# Patient Record
Sex: FEMALE | Employment: FULL TIME | URBAN - METROPOLITAN AREA
[De-identification: names, ages, dates, MRNs, and addresses within clinical notes are randomized per-mention and may not be internally consistent; named-entity substitution may affect disease eponyms.]

---

## 2024-05-07 ENCOUNTER — TRANSFERRED RECORDS (OUTPATIENT)
Dept: HEALTH INFORMATION MANAGEMENT | Facility: CLINIC | Age: 49
End: 2024-05-07

## 2024-05-10 ENCOUNTER — TRANSCRIBE ORDERS (OUTPATIENT)
Dept: OTHER | Age: 49
End: 2024-05-10

## 2024-05-10 DIAGNOSIS — G43.909 MIGRAINE: Primary | ICD-10-CM

## 2024-05-19 ENCOUNTER — HEALTH MAINTENANCE LETTER (OUTPATIENT)
Age: 49
End: 2024-05-19

## 2024-06-05 NOTE — TELEPHONE ENCOUNTER
Records Requested     June 5, 2024 10:28 AM   [id number]   Facility  [facility]   Outcome 10:35 am Sent request for recs & any imaging to be faxed/pushed. -AMADOU Maier on 6/10/2024 at 3:53 PM Records received & sent to scanning. -AMADOU Maier on 6/5/2024 at 10:31 AM   Action Taken Attempted to call Pt to ask if seen anywhere else for headaches/migraines. No answer, left VM. -AMADOU     RECORDS RECEIVED FROM: External    REASON FOR VISIT: Chronic migraines   PROVIDER: Donna Seanz MD   DATE OF APPT: 7/31/24 @ 1:00 pm    NOTES (FOR ALL VISITS) STATUS DETAILS   OFFICE NOTE from referring provider Received 5/10/24 (Transcribed Orders) Lam Amato MD @[facility]     OFFICE NOTE from other specialist In process    MEDICATION LIST N/A    IMAGING  (FOR ALL VISITS)     MRI (HEAD, NECK, SPINE) N/A    CT (HEAD, NECK, SPINE) N/A

## 2024-07-26 ASSESSMENT — MIGRAINE DISABILITY ASSESSMENT (MIDAS)
HOW MANY DAYS DID YOU MISS WORK OR SCHOOL BECAUSE OF HEADACHES: 0
HOW MANY DAYS WAS YOUR PRODUCTIVITY CUT IN HALF BECAUSE OF HEADACHES: 12
HOW MANY DAYS IN THE PAST 3 MONTHS HAVE YOU HAD A HEADACHE: 17
HOW OFTEN WERE SOCIAL ACTIVITIES MISSED DUE TO HEADACHES: 6
ON A SCALE FROM 0-10 ON AVERAGE HOW PAINFUL WERE HEADACHES: 7
HOW MANY DAYS DID YOU NOT DO HOUSEWORK BECAUSE OF HEADACHES: 12
HOW MANY DAYS WAS HOUSEWORK PRODUCTIVITY CUT IN HALF DUE TO HEADACHES: 12
TOTAL SCORE: 42

## 2024-07-26 ASSESSMENT — HEADACHE IMPACT TEST (HIT 6)
HOW OFTEN DID HEADACHS LIMIT CONCENTRATION ON WORK OR DAILY ACTIVITY: ALWAYS
HOW OFTEN DO HEADACHES LIMIT YOUR DAILY ACTIVITIES: VERY OFTEN
WHEN YOU HAVE HEADACHES HOW OFTEN IS THE PAIN SEVERE: SOMETIMES
HIT6 TOTAL SCORE: 69
WHEN YOU HAVE A HEADACHE HOW OFTEN DO YOU WISH YOU COULD LIE DOWN: VERY OFTEN
HOW OFTEN HAVE YOU FELT TOO TIRED TO WORK BECAUSE OF YOUR HEADACHES: VERY OFTEN
HOW OFTEN HAVE YOU FELT FED UP OR IRRITATED BECAUSE OF YOUR HEADACHES: ALWAYS

## 2024-07-31 ENCOUNTER — VIRTUAL VISIT (OUTPATIENT)
Dept: NEUROLOGY | Facility: CLINIC | Age: 49
End: 2024-07-31
Attending: ORTHOPAEDIC SURGERY
Payer: COMMERCIAL

## 2024-07-31 ENCOUNTER — PRE VISIT (OUTPATIENT)
Dept: NEUROLOGY | Facility: CLINIC | Age: 49
End: 2024-07-31

## 2024-07-31 DIAGNOSIS — G43.001 MIGRAINE WITHOUT AURA AND WITH STATUS MIGRAINOSUS, NOT INTRACTABLE: Primary | ICD-10-CM

## 2024-07-31 PROCEDURE — 99204 OFFICE O/P NEW MOD 45 MIN: CPT | Mod: 95 | Performed by: PSYCHIATRY & NEUROLOGY

## 2024-07-31 PROCEDURE — G2211 COMPLEX E/M VISIT ADD ON: HCPCS | Mod: 95 | Performed by: PSYCHIATRY & NEUROLOGY

## 2024-07-31 RX ORDER — LORATADINE 10 MG/1
10 TABLET ORAL DAILY
COMMUNITY

## 2024-07-31 RX ORDER — PANTOPRAZOLE SODIUM 40 MG/1
40 TABLET, DELAYED RELEASE ORAL DAILY
COMMUNITY
Start: 2014-07-26 | End: 2025-02-08

## 2024-07-31 RX ORDER — BUTALBITAL, ACETAMINOPHEN AND CAFFEINE 50; 325; 40 MG/1; MG/1; MG/1
1 TABLET ORAL EVERY 4 HOURS PRN
COMMUNITY
Start: 2024-03-31

## 2024-07-31 RX ORDER — CHLORHEXIDINE GLUCONATE ORAL RINSE 1.2 MG/ML
15 SOLUTION DENTAL 2 TIMES DAILY
COMMUNITY

## 2024-07-31 RX ORDER — FERROUS SULFATE 325(65) MG
325 TABLET ORAL
COMMUNITY
Start: 2024-03-30

## 2024-07-31 RX ORDER — BISACODYL 5 MG/1
5 TABLET, DELAYED RELEASE ORAL DAILY
COMMUNITY
Start: 2024-07-18

## 2024-07-31 RX ORDER — SUMATRIPTAN 100 MG/1
100 TABLET, FILM COATED ORAL
Qty: 18 TABLET | Refills: 11 | Status: SHIPPED | OUTPATIENT
Start: 2024-07-31

## 2024-07-31 RX ORDER — FLUTICASONE PROPIONATE 50 MCG
2 SPRAY, SUSPENSION (ML) NASAL
COMMUNITY

## 2024-07-31 RX ORDER — ONDANSETRON 4 MG/1
1 TABLET, FILM COATED ORAL EVERY 6 HOURS PRN
COMMUNITY
Start: 2024-07-18

## 2024-07-31 NOTE — LETTER
7/31/2024       RE: Melissa Lucero  [address]  [facility] [address]     Dear Colleague,    Thank you for referring your patient, Melissa Lucero, to the [facility] [place] at [facility]. Please see a copy of my visit note below.    Virtual Visit Details    Type of service:  Video Visit     Originating Location (pt. Location): Home    Distant Location (provider location):  Off-site  Platform used for Video Visit: [facility]   Virtual Headache Neurology Consult  July 31, 2024     Melissa Lucero MRN# [id number]   YOB: 1975 Age: 48 year old     Requesting physician: Lam Amato MD  PCP: Gifty Heredia MD         Assessment and Recommendations:     Melissa Lucero is a 48 year old female who presents for further evaluation of headache.    Her headaches meet criteria for episodic migraine without aura. I suspect she has this on a genetic basis. She is currently breastfeeding.    Her virtual neurologic exam is intact today. Reportedly, previous head imaging was unrevealing in 2000's.     I recommend the following treatment strategy:  -For acute treatment of moderate to severe headache, I recommend sumatriptan 100 mg at the onset of headache, with a repeat dose in two hours if needed.    She would not currently choose a preventive medication, although is interested in this for the future, once she has completed breastfeeding.  -Discuss CGRP inhibitors in 6 months.    The longitudinal plan of care for Melissa was addressed during this visit. Due to the added complexity in care, I will continue to support Melissa in the subsequent management of this condition(s) and with the ongoing continuity of care of this condition(s).    Donna Saenz MD  Neurology            Chief Complaint:     No chief complaint on file.          History is obtained from the patient and medical record.   Patient was seen via a virtual visit in their home.      Melissa Lucero is a 48 year old female who has been living with headache since age 16, starting seeking help around .     She describes headache as posterior and left sided, can wrap around head as well. It is a pressure, stabbing/spiking pain. They rate 6-8/10, up to 9-10/10. They last 10-18 hours, up to 2 days.     She has associated photophobia, phonophobia, rare nausea.    She denies typical aura. She denies unilateral autonomic features. No fevers or other illness associated.    She has 10-14/30 headache days per month, with 3-6/30 severe headache days per month    Triggers: lights, cashews.    She is using Aleve.  She used Imitrex tablet and injection as needed. She found 100 mg effective after 45 minutes.  She used butalbital in pregnancy.    She has tried:  Propranolol - side effects or not effective  Topiramate - side effects or not effective  Anti-depressant medication - side effects or not effective  Botox - drooping of eyebrow and no effect, two round for headache, others for cosmetic reasons, not effective            Past Medical History:   Neck spasm on right side, trapezius muscle           Past Surgical History:   Low back surgery - laminectomy lumbar spine   Uvulectomy and tonsillectomy    in  and           Social History:   She is working at a desk all day long.    She is  with children.    She denies smoking. She denies alcohol. She denies drug use.     She drinks caffeine, 1-2 cups of black tea or coffee daily.          Family History:   Father           Allergies:    No Known Allergies          Medications:     Current Outpatient Medications:     bisacodyl (DULCOLAX) 5 MG EC tablet, Take 5 mg by mouth daily, Disp: , Rfl:     butalbital-acetaminophen-caffeine (ESGIC) -40 MG tablet, Take 1 tablet by mouth every 4 hours as needed, Disp: , Rfl:     chlorhexidine (PERIDEX) 0.12 % solution, Take 15 mLs by  mouth 2 times daily, Disp: , Rfl:     ferrous sulfate (FEROSUL) 325 (65 Fe) MG tablet, Take 325 mg by mouth every 48 hours, Disp: , Rfl:     fluticasone (FLONASE) 50 MCG/ACT nasal spray, Spray 2 sprays in nostril, Disp: , Rfl:     loratadine (CLARITIN) 10 MG tablet, Take 10 mg by mouth daily, Disp: , Rfl:     ondansetron (ZOFRAN) 4 MG tablet, Take 1 tablet by mouth every 6 hours as needed, Disp: , Rfl:     pantoprazole (PROTONIX) 40 MG EC tablet, Take 40 mg by mouth daily, Disp: , Rfl:           Physical Exam:   There were no vitals taken for this visit.   Physical Exam:   General: NAD  Neurologic:   Mental Status Exam: Alert, awake and oriented to situation. No dysarthria. Speech of normal fluency.   Cranial Nerves: Pupils equal, EOMs intact, no nystagmus, facial movements symmetric, hearing intact to conversation, tongue midline and fully mobile. No tongue atrophy or fasciculations.    Motor: No drift in upper extremities. Able to stand from a seated position without use of arms. No tremors or abnormal movements noted.   Coordination: With arms outstretched, able to touch nose using index finger accurately bilaterally.  Normal finger tapping bilaterally.     Gait: Normal stance and casual gait.    Neck: Reduced range of motion with lateral head movements and neck flexion.  Eyes: No conjunctival injection, no scleral icterus.           Data:   Eye exam UTD    Previous MRI brain in 2007/2008. No concerns per patient.         Again, thank you for allowing me to participate in the care of your patient.      Sincerely,    Donna Saenz MD

## 2024-07-31 NOTE — NURSING NOTE
Current patient location: [facility] [place] [facility] [address]    Is the patient currently in the state of [place]? YES    Visit mode:VIDEO    If the visit is dropped, the patient can be reconnected by: TELEPHONE VISIT: Phone number:   Telephone Information:   Mobile [phone]       Will anyone else be joining the visit? NO  (If patient encounters technical issues they should call [phone]    How would you like to obtain your AVS? MyChart    Are changes needed to the allergy or medication list? Pt stated no med changes    Are refills needed on medications prescribed by this physician? NO    Rooming Documentation:  Assigned questionnaire(s) completed      Reason for visit: Video Visit (Chronic migraines )    Glenis NGUYEN

## 2024-11-25 DIAGNOSIS — G43.001 MIGRAINE WITHOUT AURA AND WITH STATUS MIGRAINOSUS, NOT INTRACTABLE: Primary | ICD-10-CM

## 2024-11-25 RX ORDER — FREMANEZUMAB-VFRM 225 MG/1.5ML
1.5 INJECTION SUBCUTANEOUS
Qty: 1.5 ML | Refills: 11 | Status: SHIPPED | OUTPATIENT
Start: 2024-11-25

## 2024-11-25 NOTE — PROGRESS NOTES
Patient completed breastfeeding. Would like to start a CGRP inhibitor for migraine prevention, as previously discussed. Ordered Danitza Kitchen MD

## 2025-03-19 ASSESSMENT — HEADACHE IMPACT TEST (HIT 6)
HOW OFTEN HAVE YOU FELT FED UP OR IRRITATED BECAUSE OF YOUR HEADACHES: VERY OFTEN
WHEN YOU HAVE A HEADACHE HOW OFTEN DO YOU WISH YOU COULD LIE DOWN: VERY OFTEN
HOW OFTEN HAVE YOU FELT TOO TIRED TO WORK BECAUSE OF YOUR HEADACHES: SOMETIMES
HOW OFTEN DID HEADACHS LIMIT CONCENTRATION ON WORK OR DAILY ACTIVITY: ALWAYS
HIT6 TOTAL SCORE: 67
HOW OFTEN DO HEADACHES LIMIT YOUR DAILY ACTIVITIES: VERY OFTEN
WHEN YOU HAVE HEADACHES HOW OFTEN IS THE PAIN SEVERE: VERY OFTEN

## 2025-03-19 ASSESSMENT — MIGRAINE DISABILITY ASSESSMENT (MIDAS)
HOW MANY DAYS DID YOU NOT DO HOUSEWORK BECAUSE OF HEADACHES: 10
HOW OFTEN WERE SOCIAL ACTIVITIES MISSED DUE TO HEADACHES: 0
HOW MANY DAYS DID YOU MISS WORK OR SCHOOL BECAUSE OF HEADACHES: 0
HOW MANY DAYS IN THE PAST 3 MONTHS HAVE YOU HAD A HEADACHE: 23
ON A SCALE FROM 0-10 ON AVERAGE HOW PAINFUL WERE HEADACHES: 7
HOW MANY DAYS WAS YOUR PRODUCTIVITY CUT IN HALF BECAUSE OF HEADACHES: 8
TOTAL SCORE: 28
HOW MANY DAYS WAS HOUSEWORK PRODUCTIVITY CUT IN HALF DUE TO HEADACHES: 10

## 2025-03-24 ENCOUNTER — VIRTUAL VISIT (OUTPATIENT)
Dept: NEUROLOGY | Facility: CLINIC | Age: 50
End: 2025-03-24
Payer: COMMERCIAL

## 2025-03-24 DIAGNOSIS — G43.001 MIGRAINE WITHOUT AURA AND WITH STATUS MIGRAINOSUS, NOT INTRACTABLE: ICD-10-CM

## 2025-03-24 PROCEDURE — 1126F AMNT PAIN NOTED NONE PRSNT: CPT | Mod: 95 | Performed by: PSYCHIATRY & NEUROLOGY

## 2025-03-24 PROCEDURE — G2211 COMPLEX E/M VISIT ADD ON: HCPCS | Mod: 95 | Performed by: PSYCHIATRY & NEUROLOGY

## 2025-03-24 PROCEDURE — 98006 SYNCH AUDIO-VIDEO EST MOD 30: CPT | Performed by: PSYCHIATRY & NEUROLOGY

## 2025-03-24 RX ORDER — SUMATRIPTAN SUCCINATE 100 MG/1
100 TABLET ORAL
Qty: 18 TABLET | Refills: 11 | Status: SHIPPED | OUTPATIENT
Start: 2025-03-24

## 2025-03-24 ASSESSMENT — PAIN SCALES - GENERAL: PAINLEVEL_OUTOF10: NO PAIN (0)

## 2025-03-24 NOTE — NURSING NOTE
Current patient location: Patient declined to provide     Is the patient currently in the state of [place]? YES    Visit mode: VIDEO    If the visit is dropped, the patient can be reconnected by:VIDEO VISIT: Text to cell phone:   Telephone Information:   Mobile [phone]    and VIDEO VISIT: Send to e-mail at: [email or web address]    Will anyone else be joining the visit? NO  (If patient encounters technical issues they should call [phone])    Are changes needed to the allergy or medication list? No    Are refills needed on medications prescribed by this physician? NO    Rooming Documentation:  Questionnaire(s) completed    Reason for visit: CESAR DAVISF

## 2025-03-24 NOTE — PROGRESS NOTES
Virtual Visit Details    Type of service:  Video Visit     Originating Location (pt. Location): Home    Distant Location (provider location):  On-site  Platform used for Video Visit: [facility]    Headache Neurology Progress Note  March 24, 2025      Assessment/Plan:   Melissa Lucero is a 49 year old woman who presents for further evaluation of headache.     Her headaches meet criteria for frequent episodic migraine without aura.    I recommend the following treatment strategy:  -For acute treatment of moderate to severe headache, I recommend sumatriptan 100 mg at the onset of headache, with a repeat dose in two hours if needed.     Her headache frequency and severity warrant prevention.  Ajovy was not adequately effective.  -CGRP inhibitors: Aimovig, Emgality, Qulipta, Nurtec, Vyepti  -She will send a message after considering these options.    The longitudinal plan of care for Melissa was addressed during this visit. Due to the added complexity in care, I will continue to support Melissa in the subsequent management of this condition(s) and with the ongoing continuity of care of this condition(s).  I will plan to see her back in 3 to 6 months.    Donna Saenz MD  Neurology     Subjective:    Melissa Lucero returns for follow up of episodic migraine without aura.    Ajovy not effective after second injection. 9 in a row, within 14 days.     Since February 2025, she reports 2-3 moderate to severe headaches weekly. Can wake up with headaches as well, which dissipate within 1-2 hours. These feel more muscular, like neck discomfort from sleeping, 3-5 times per week.    Has a 1 year old and 2 year old children.    Sound triggers.    Restarted menstrual cycle after pregnancy and breastfeeding. Stopped breastfeeding last November, cycle returned February 14th. Had 1-2 headaches with cycles recently.    Takes sumatriptan as needed. Also Aleve as needed. Useful 90% of the  time.    She has tried:  Propranolol - side effects or not effective  Topiramate - side effects or not effective  Anti-depressant medication - side effects or not effective  Botox - drooping of eyebrow and no effect, two round for headache, others for cosmetic reasons, not effective    Objective:    Vitals: There were no vitals taken for this visit.  General: Cooperative, NAD  Neurologic:  Mental Status: Fully alert, attentive and oriented. Speech clear and fluent.   Cranial Nerves: Facial movements symmetric.   Motor: No abnormal movements.      Pertinent Investigations:          7/26/2024    12:08 PM 3/19/2025     9:38 AM   HIT-6   When you have headaches, how often is the pain severe 10 11   How often do headaches limit your ability to do usual daily activities including household work, work, school, or social activities? 11 11   When you have a headache, how often do you wish you could lie down? 11 11   In the past 4 weeks, how often have you felt too tired to do work or daily activities because of your headaches 11 10   In the past 4 weeks, how often have you felt fed up or irritated because of your headaches 13 11   In the past 4 weeks, how often did headaches limit your ability to concentrate on work or daily activities 13 13   HIT-6 Total Score 69 67        Patient-reported           7/26/2024    12:34 PM 3/19/2025     9:42 AM   MIDAS - in the past three months:   On how many days did you miss work or school because of your headaches? 0 0   How many days was your productivity at work or school reduced by half or more because of your headaches? 12 8   On how many days did you not do household work because of your headaches? 12 10   How many days was your productivity in household work reduced by half or more because of your headaches? 12 10   On how many days did you miss family, social, or leisure activities because of your headaches? 6 0   On how many days did you have a headache? 17 23   On a scale of  0-10, on average how painful were these headaches? 7 7   MIDAS Score 42 (IV - Severe Disability) 28 (IV - Severe Disability)

## 2025-03-24 NOTE — LETTER
3/24/2025       RE: Melissa Lucero  [address]  [facility] [address]     Dear Colleague,    Thank you for referring your patient, Melissa Lucero, to the [facility] [place] at [facility]. Please see a copy of my visit note below.    Virtual Visit Details    Type of service:  Video Visit     Originating Location (pt. Location): Home    Distant Location (provider location):  On-site  Platform used for Video Visit: [facility]    Headache Neurology Progress Note  March 24, 2025      Assessment/Plan:   Melissa Lucero is a 49 year old woman who presents for further evaluation of headache.     Her headaches meet criteria for frequent episodic migraine without aura.    I recommend the following treatment strategy:  -For acute treatment of moderate to severe headache, I recommend sumatriptan 100 mg at the onset of headache, with a repeat dose in two hours if needed.     Her headache frequency and severity warrant prevention.  Ajovy was not adequately effective.  -CGRP inhibitors: Aimovig, Emgality, Qulipta, Nurtec, Vyepti  -She will send a message after considering these options.    The longitudinal plan of care for Melissa was addressed during this visit. Due to the added complexity in care, I will continue to support Melissa in the subsequent management of this condition(s) and with the ongoing continuity of care of this condition(s).  I will plan to see her back in 3 to 6 months.    Donna Saenz MD  Neurology     Subjective:    Melissa Lucero returns for follow up of episodic migraine without aura.    Ajovy not effective after second injection. 9 in a row, within 14 days.     Since February 2025, she reports 2-3 moderate to severe headaches weekly. Can wake up with headaches as well, which dissipate within 1-2 hours. These feel more muscular, like neck discomfort from sleeping, 3-5  times per week.    Has a 1 year old and 2 year old children.    Sound triggers.    Restarted menstrual cycle after pregnancy and breastfeeding. Stopped breastfeeding last November, cycle returned February 14th. Had 1-2 headaches with cycles recently.    Takes sumatriptan as needed. Also Aleve as needed. Useful 90% of the time.    She has tried:  Propranolol - side effects or not effective  Topiramate - side effects or not effective  Anti-depressant medication - side effects or not effective  Botox - drooping of eyebrow and no effect, two round for headache, others for cosmetic reasons, not effective    Objective:    Vitals: There were no vitals taken for this visit.  General: Cooperative, NAD  Neurologic:  Mental Status: Fully alert, attentive and oriented. Speech clear and fluent.   Cranial Nerves: Facial movements symmetric.   Motor: No abnormal movements.      Pertinent Investigations:          7/26/2024    12:08 PM 3/19/2025     9:38 AM   HIT-6   When you have headaches, how often is the pain severe 10 11   How often do headaches limit your ability to do usual daily activities including household work, work, school, or social activities? 11 11   When you have a headache, how often do you wish you could lie down? 11 11   In the past 4 weeks, how often have you felt too tired to do work or daily activities because of your headaches 11 10   In the past 4 weeks, how often have you felt fed up or irritated because of your headaches 13 11   In the past 4 weeks, how often did headaches limit your ability to concentrate on work or daily activities 13 13   HIT-6 Total Score 69 67        Patient-reported           7/26/2024    12:34 PM 3/19/2025     9:42 AM   MIDAS - in the past three months:   On how many days did you miss work or school because of your headaches? 0 0   How many days was your productivity at work or school reduced by half or more because of your headaches? 12 8   On how many days did you not do household  work because of your headaches? 12 10   How many days was your productivity in household work reduced by half or more because of your headaches? 12 10   On how many days did you miss family, social, or leisure activities because of your headaches? 6 0   On how many days did you have a headache? 17 23   On a scale of 0-10, on average how painful were these headaches? 7 7   MIDAS Score 42 (IV - Severe Disability) 28 (IV - Severe Disability)          Again, thank you for allowing me to participate in the care of your patient.      Sincerely,    Donna Saenz MD

## 2025-03-31 ENCOUNTER — TELEPHONE (OUTPATIENT)
Dept: NEUROLOGY | Facility: CLINIC | Age: 50
End: 2025-03-31
Payer: COMMERCIAL

## 2025-03-31 DIAGNOSIS — G43.709 CHRONIC MIGRAINE WITHOUT AURA WITHOUT STATUS MIGRAINOSUS, NOT INTRACTABLE: Primary | ICD-10-CM

## 2025-03-31 NOTE — TELEPHONE ENCOUNTER
Patient confirmed scheduled appointment:  Date: 7/23/25  Time: 3pm  Visit type: Return Headache  Provider: Letty  Location: Virtual  Testing/imaging: NA  Additional notes: Follow up    Pia Lord on 3/31/2025 at 5:08 PM

## 2025-06-08 ENCOUNTER — HEALTH MAINTENANCE LETTER (OUTPATIENT)
Age: 50
End: 2025-06-08

## 2025-07-19 ASSESSMENT — HEADACHE IMPACT TEST (HIT 6)
HOW OFTEN HAVE YOU FELT TOO TIRED TO WORK BECAUSE OF YOUR HEADACHES: SOMETIMES
HOW OFTEN DID HEADACHS LIMIT CONCENTRATION ON WORK OR DAILY ACTIVITY: SOMETIMES
HOW OFTEN HAVE YOU FELT FED UP OR IRRITATED BECAUSE OF YOUR HEADACHES: SOMETIMES
WHEN YOU HAVE A HEADACHE HOW OFTEN DO YOU WISH YOU COULD LIE DOWN: VERY OFTEN
HIT6 TOTAL SCORE: 62
WHEN YOU HAVE HEADACHES HOW OFTEN IS THE PAIN SEVERE: VERY OFTEN
HOW OFTEN DO HEADACHES LIMIT YOUR DAILY ACTIVITIES: SOMETIMES

## 2025-07-19 ASSESSMENT — MIGRAINE DISABILITY ASSESSMENT (MIDAS)
TOTAL SCORE: 26
HOW MANY DAYS WAS YOUR PRODUCTIVITY CUT IN HALF BECAUSE OF HEADACHES: 12
HOW MANY DAYS DID YOU MISS WORK OR SCHOOL BECAUSE OF HEADACHES: 0
HOW OFTEN WERE SOCIAL ACTIVITIES MISSED DUE TO HEADACHES: 2
HOW MANY DAYS WAS HOUSEWORK PRODUCTIVITY CUT IN HALF DUE TO HEADACHES: 8
ON A SCALE FROM 0-10 ON AVERAGE HOW PAINFUL WERE HEADACHES: 7
HOW MANY DAYS IN THE PAST 3 MONTHS HAVE YOU HAD A HEADACHE: 12
HOW MANY DAYS DID YOU NOT DO HOUSEWORK BECAUSE OF HEADACHES: 4

## 2025-07-23 ENCOUNTER — VIRTUAL VISIT (OUTPATIENT)
Dept: NEUROLOGY | Facility: CLINIC | Age: 50
End: 2025-07-23
Payer: COMMERCIAL

## 2025-07-23 DIAGNOSIS — G43.709 CHRONIC MIGRAINE WITHOUT AURA WITHOUT STATUS MIGRAINOSUS, NOT INTRACTABLE: Primary | ICD-10-CM

## 2025-07-23 PROCEDURE — 98005 SYNCH AUDIO-VIDEO EST LOW 20: CPT | Performed by: PSYCHIATRY & NEUROLOGY

## 2025-07-23 PROCEDURE — 1126F AMNT PAIN NOTED NONE PRSNT: CPT | Mod: 95 | Performed by: PSYCHIATRY & NEUROLOGY

## 2025-07-23 PROCEDURE — G2211 COMPLEX E/M VISIT ADD ON: HCPCS | Mod: 95 | Performed by: PSYCHIATRY & NEUROLOGY

## 2025-07-23 NOTE — LETTER
7/23/2025       RE: Melissa Lucero  [address]  [facility] [address]     Dear Colleague,    Thank you for referring your patient, Melissa Lucero, to the [facility] [place] at [facility]. Please see a copy of my visit note below.    Virtual Visit Details    Type of service:  Video Visit     Originating Location (pt. Location): Home    Distant Location (provider location):  Off-site  Platform used for Video Visit: [facility]    Headache Neurology Progress Note  July 23, 2025      Assessment/Plan:   Melissa Lucero is a 49 year old woman who returns for follow up of frequent episodic migraine without aura. Qulipta is helpful in reducing headache frequency. Sumatriptan is helpful as needed. Will continue this plan.     I recommend the following treatment strategy:  -For acute treatment of moderate to severe headache, I recommend sumatriptan 100 mg at the onset of headache, with a repeat dose in two hours if needed.     Her headache frequency and severity warrant prevention.  Qulipta trial was effective. I recommend continuing Qulipta 60 mg daily.  - Ajovy was not adequately effective.  - Other CGRP inhibitor options: Aimovig, Emgality, Nurtec, Vyepti    The longitudinal plan of care for Melissa was addressed during this visit. Due to the added complexity in care, I will continue to support Melissa in the subsequent management of this condition(s) and with the ongoing continuity of care of this condition(s). Return in 6-12 months.    Donna Saenz MD  Neurology     Subjective:    Melissa Lucero returns for follow up of episodic migraine.    Tried Qulipta 60 mg daily since last visit. Possibly fatigue as a side effect.    Today, she reports that she is stable, about 4-5 days per months or headache. She can tell if a headache is going to be bad.    Treats breakthrough headaches, she  takes sumatriptan and Aleve, Salonpas on forehead. For milder attacks, starts with Aleve and adds on sumatriptan if needed.    Triggers: loud shrieking    She has tried:  Propranolol - side effects or not effective  Topiramate - side effects or not effective  Anti-depressant medication - side effects or not effective  Botox - drooping of eyebrow and no effect, two round for headache, others for cosmetic reasons, not effective    Objective:    Vitals: There were no vitals taken for this visit.  General: Cooperative, NAD  Neurologic:  Mental Status: Fully alert, attentive and oriented. Speech clear and fluent.   Cranial Nerves: Facial movements symmetric.   Motor: No abnormal movements.      Pertinent Investigations:          7/26/2024    12:08 PM 3/19/2025     9:38 AM 7/19/2025     9:48 PM   HIT-6   When you have headaches, how often is the pain severe 10 11 11   How often do headaches limit your ability to do usual daily activities including household work, work, school, or social activities? 11 11 10   When you have a headache, how often do you wish you could lie down? 11 11 11   In the past 4 weeks, how often have you felt too tired to do work or daily activities because of your headaches 11 10 10   In the past 4 weeks, how often have you felt fed up or irritated because of your headaches 13 11 10   In the past 4 weeks, how often did headaches limit your ability to concentrate on work or daily activities 13 13 10   HIT-6 Total Score 69 67  62        Patient-reported           7/26/2024    12:34 PM 3/19/2025     9:42 AM 7/19/2025     9:49 PM   MIDAS - in the past three months:   On how many days did you miss work or school because of your headaches? 0 0 0   How many days was your productivity at work or school reduced by half or more because of your headaches? 12 8 12   On how many days did you not do household work because of your headaches? 12 10 4   How many days was your productivity in household work reduced  by half or more because of your headaches? 12 10 8   On how many days did you miss family, social, or leisure activities because of your headaches? 6 0 2   On how many days did you have a headache? 17 23 12   On a scale of 0-10, on average how painful were these headaches? 7 7 7   MIDAS Score 42 (IV - Severe Disability) 28 (IV - Severe Disability) 26 (IV - Severe Disability)          Again, thank you for allowing me to participate in the care of your patient.      Sincerely,    Donna Saenz MD

## 2025-07-23 NOTE — PROGRESS NOTES
Virtual Visit Details    Type of service:  Video Visit     Originating Location (pt. Location): Home    Distant Location (provider location):  Off-site  Platform used for Video Visit: [facility]    Headache Neurology Progress Note  July 23, 2025      Assessment/Plan:   Melissa Lucero is a 49 year old woman who returns for follow up of frequent episodic migraine without aura. Qulipta is helpful in reducing headache frequency. Sumatriptan is helpful as needed. Will continue this plan.     I recommend the following treatment strategy:  -For acute treatment of moderate to severe headache, I recommend sumatriptan 100 mg at the onset of headache, with a repeat dose in two hours if needed.     Her headache frequency and severity warrant prevention.  Qulipta trial was effective. I recommend continuing Qulipta 60 mg daily.  - Ajovy was not adequately effective.  - Other CGRP inhibitor options: Aimovig, Emgality, Nurtec, Vyepti    The longitudinal plan of care for Melissa was addressed during this visit. Due to the added complexity in care, I will continue to support Melissa in the subsequent management of this condition(s) and with the ongoing continuity of care of this condition(s). Return in 6-12 months.    Donna Saenz MD  Neurology     Subjective:    Melissa Lucero returns for follow up of episodic migraine.    Tried Qulipta 60 mg daily since last visit. Possibly fatigue as a side effect.    Today, she reports that she is stable, about 4-5 days per months or headache. She can tell if a headache is going to be bad.    Treats breakthrough headaches, she takes sumatriptan and Aleve, Salonpas on forehead. For milder attacks, starts with Aleve and adds on sumatriptan if needed.    Triggers: loud shrieking    She has tried:  Propranolol - side effects or not effective  Topiramate - side effects or not effective  Anti-depressant medication - side effects or not effective  Botox -  drooping of eyebrow and no effect, two round for headache, others for cosmetic reasons, not effective    Objective:    Vitals: There were no vitals taken for this visit.  General: Cooperative, NAD  Neurologic:  Mental Status: Fully alert, attentive and oriented. Speech clear and fluent.   Cranial Nerves: Facial movements symmetric.   Motor: No abnormal movements.      Pertinent Investigations:          7/26/2024    12:08 PM 3/19/2025     9:38 AM 7/19/2025     9:48 PM   HIT-6   When you have headaches, how often is the pain severe 10 11 11   How often do headaches limit your ability to do usual daily activities including household work, work, school, or social activities? 11 11 10   When you have a headache, how often do you wish you could lie down? 11 11 11   In the past 4 weeks, how often have you felt too tired to do work or daily activities because of your headaches 11 10 10   In the past 4 weeks, how often have you felt fed up or irritated because of your headaches 13 11 10   In the past 4 weeks, how often did headaches limit your ability to concentrate on work or daily activities 13 13 10   HIT-6 Total Score 69 67  62        Patient-reported           7/26/2024    12:34 PM 3/19/2025     9:42 AM 7/19/2025     9:49 PM   MIDAS - in the past three months:   On how many days did you miss work or school because of your headaches? 0 0 0   How many days was your productivity at work or school reduced by half or more because of your headaches? 12 8 12   On how many days did you not do household work because of your headaches? 12 10 4   How many days was your productivity in household work reduced by half or more because of your headaches? 12 10 8   On how many days did you miss family, social, or leisure activities because of your headaches? 6 0 2   On how many days did you have a headache? 17 23 12   On a scale of 0-10, on average how painful were these headaches? 7 7 7   MIDAS Score 42 (IV - Severe Disability) 28 (IV  - Severe Disability) 26 (IV - Severe Disability)

## 2025-07-23 NOTE — NURSING NOTE
Current patient location: [address] [place] [id number]    Is the patient currently in the state of [place]? YES    Visit mode: VIDEO    If the visit is dropped, the patient can be reconnected by:TELEPHONE VISIT: Phone number:   Telephone Information:   Mobile [phone]       Will anyone else be joining the visit? NO  (If patient encounters technical issues they should call [phone]    Are changes needed to the allergy or medication list? Pt stated no med changes    Are refills needed on medications prescribed by this physician? NO    Rooming Documentation:  Questionnaire(s) completed    Reason for visit: Headache    Glenis Espinoza VVF

## 2025-07-28 DIAGNOSIS — G43.709 CHRONIC MIGRAINE WITHOUT AURA WITHOUT STATUS MIGRAINOSUS, NOT INTRACTABLE: ICD-10-CM

## 2025-07-28 RX ORDER — ATOGEPANT 60 MG/1
60 TABLET ORAL DAILY
Qty: 30 TABLET | Refills: 3 | Status: SHIPPED | OUTPATIENT
Start: 2025-07-28

## 2025-07-28 NOTE — TELEPHONE ENCOUNTER
[facility] Task Note    TASK    Neurology Rx Refill  Medication Atogepant (QULIPTA) 60 MG tablet    Dose Take 1 tablet (60 mg) by mouth daily. - Oral    Last refill ordered (m/d/y) 03/31/2025   Last quantity ordered 30   Last # refills 3   Last clinic visit with ordering provider (m/d/y) 07/03/2025   Next clinic visit with ordering provider (m/d/y) None Scheduled    All pertinent protocol data (lab date/result)    Pertinent information from patient's message        FOLLOW-UP      ADDITIONAL COMMENTS      Carmen Ford